# Patient Record
Sex: FEMALE | Race: WHITE | NOT HISPANIC OR LATINO | Employment: OTHER | URBAN - METROPOLITAN AREA
[De-identification: names, ages, dates, MRNs, and addresses within clinical notes are randomized per-mention and may not be internally consistent; named-entity substitution may affect disease eponyms.]

---

## 2017-09-19 PROBLEM — R25.1 TREMORS OF NERVOUS SYSTEM: Status: ACTIVE | Noted: 2017-09-19

## 2017-11-27 PROBLEM — I48.0 PAF (PAROXYSMAL ATRIAL FIBRILLATION): Status: ACTIVE | Noted: 2017-11-27

## 2018-01-18 ENCOUNTER — OFFICE VISIT (OUTPATIENT)
Dept: URGENT CARE | Facility: CLINIC | Age: 78
End: 2018-01-18
Payer: MEDICARE

## 2018-01-18 VITALS
DIASTOLIC BLOOD PRESSURE: 91 MMHG | TEMPERATURE: 98 F | OXYGEN SATURATION: 96 % | WEIGHT: 171 LBS | BODY MASS INDEX: 26.84 KG/M2 | SYSTOLIC BLOOD PRESSURE: 144 MMHG | RESPIRATION RATE: 16 BRPM | HEIGHT: 67 IN | HEART RATE: 77 BPM

## 2018-01-18 DIAGNOSIS — R52 PAIN: ICD-10-CM

## 2018-01-18 DIAGNOSIS — S93.401A SPRAIN OF RIGHT ANKLE, UNSPECIFIED LIGAMENT, INITIAL ENCOUNTER: Primary | ICD-10-CM

## 2018-01-18 PROCEDURE — 99203 OFFICE O/P NEW LOW 30 MIN: CPT | Mod: S$GLB,,, | Performed by: PHYSICIAN ASSISTANT

## 2018-01-18 RX ORDER — METHYLPREDNISOLONE 4 MG/1
TABLET ORAL
Qty: 1 PACKAGE | Refills: 0 | Status: SHIPPED | OUTPATIENT
Start: 2018-01-18 | End: 2018-02-22 | Stop reason: ALTCHOICE

## 2018-01-18 NOTE — PATIENT INSTRUCTIONS
- Please return here or go to the Emergency Department for any concerns or worsening of condition.   - Please follow up with your primary care provider (PCP) or discussed specialist(s) as needed.           Understanding Ankle Sprain    The ankle is the joint where the leg and foot meet. Bones are held in place by connective tissue called ligaments. When ankle ligaments are stretched to the point of pain and injury, it is called an ankle sprain. A sprain can tear the ligaments. These tears can be very small but still cause pain. Ankle sprains can be mild or severe.  What causes an ankle sprain?  A sprain may occur when you twist your ankle or bend it too far. This can happen when you stumble or fall. Things that can make an ankle sprain more likely include:  · Having had an ankle sprain before  · Playing sports that involve running and jumping. Or playing contact sports such as football or hockey.  · Wearing shoes that dont support your feet and ankles well  · Having ankles with poor strength and flexibility  Symptoms of an ankle sprain  Symptoms may include:  · Pain or soreness in the ankle  · Swelling  · Redness or bruising  · Not being able to walk or put weight on the affected foot  · Reduced range of motion in the ankle  · A popping or tearing feeling at the time the sprain occurs  · An abnormal or dislocated look to the ankle  · Instability or too much range of motion in the ankle  Treatment for an ankle sprain  Treatment focuses on reducing pain and swelling, and avoiding further injury. Treatments may include:  · Resting the ankle. Avoid putting weight on it. This may mean using crutches until the sprain heals.  · Prescription or over-the-counter pain medicines. These help reduce swelling and pain.  · Cold packs. These help reduce pain and swelling.  · Raising your ankle above your heart. This helps reduce swelling.  · Wrapping the ankle with an elastic bandage or ankle brace. This helps reduce swelling  and gives some support to the ankle. In rare cases, you may need a cast or boot.  · Stretching and other exercises. These improve flexibility and strength.  · Heat packs. These may be recommended before doing ankle exercises.  Possible complications of an ankle sprain  An ankle that has been weakened by a sprain can be more likely to have repeated sprains afterward. Doing exercises to strengthen your ankle and improve balance can reduce your risk for repeated sprains. Other possible complications are long-term (chronic) pain or an ankle that remains unstable.  When to call your healthcare provider  Call your healthcare provider right away if you have any of these:  · Fever of 100.4°F (38°C) or higher, or as directed  · Pain, numbness, discoloration, or coldness in the foot or toes  · Pain that gets worse  · Symptoms that dont get better, or get worse  · New symptoms   Date Last Reviewed: 3/10/2016  © 8829-7094 [employer]. [address] [place] [address]. All rights reserved. This information is not intended as a substitute for professional medical care. Always follow your healthcare professional's instructions.      Treating Ankle Sprains  Treatment will depend on how bad your sprain is. For a severe sprain, healing may take 3 months or more.  Right after your injury: Use R.I.C.E.  · Rest: At first, keep weight off the ankle as much as you can. You may be given crutches to help you walk without putting weight on the ankle.  · Ice: Put an ice pack on the ankle for 15 minutes. Remove the pack and wait at least 30 minutes. Repeat for up to 3 days. This helps reduce swelling.  · Compression: To reduce swelling and keep the joint stable, you may need to wrap the ankle with an elastic bandage. For more severe sprains, you may need an ankle brace or a cast.  · Elevation: To reduce swelling, keep your ankle raised above your heart when you sit or lie down.  Medicine  Your healthcare provider may  suggest oral non-steroidal anti-inflammatory medicine (NSAIDs), such as ibuprofen. This relieves the pain and helps reduce any swelling. Be sure to take your medicine as directed.  Contrast baths  After 3 days, soak your ankle in warm water for 30 seconds, then in cool water for 30 seconds. Go back and forth for 5 minutes. Doing this every 2 hours will help keep the swelling down.  Exercises    After about 2 to 3 weeks, you may be given exercises to strengthen the ligaments and muscles in the ankle. Doing these exercises will help prevent another ankle sprain. Exercises may include standing on your toes and then on your heels and doing ankle curls.  Ankle curls  · Sit on the edge of a sturdy table or lie on your back.  · Pull your toes toward you. Then point them away from you. Repeat for 2 to 3 minutes.   Date Last Reviewed: 9/28/2015  © 1169-4101 [email or web address] [address] [place] [address], [place] [address]. All rights reserved. This information is not intended as a substitute for professional medical care. Always follow your healthcare professional's instructions.

## 2018-01-18 NOTE — PROGRESS NOTES
"Subjective:       Patient ID: Anjelica Woods is a 77 y.o. female.    Vitals:  height is 5' 7" (1.702 m) and weight is 77.6 kg (171 lb). Her temperature is 97.8 °F (36.6 °C). Her blood pressure is 144/91 (abnormal) and her pulse is 77. Her respiration is 16 and oxygen saturation is 96%.     Chief Complaint: Ankle Injury ( Right ankle injury.)    Ankle Injury    The incident occurred 2 days ago. The incident occurred at home. The injury mechanism was a twisting injury. The pain is present in the right ankle. The pain is at a severity of 5/10. The pain is moderate. The pain has been worsening since onset. Associated symptoms include an inability to bear weight and a loss of motion. Pertinent negatives include no loss of sensation, muscle weakness, numbness or tingling. She reports no foreign bodies present. The symptoms are aggravated by weight bearing, movement and palpation. She has tried nothing for the symptoms.     Review of Systems   Constitution: Negative for chills, fever, weakness and malaise/fatigue.   HENT: Negative for nosebleeds and sore throat.    Cardiovascular: Negative for chest pain and syncope.   Respiratory: Negative for shortness of breath.    Skin: Negative for itching and rash.   Musculoskeletal: Positive for joint pain and joint swelling. Negative for back pain and neck pain.   Gastrointestinal: Negative for abdominal pain.   Genitourinary: Negative for hematuria.   Neurological: Negative for dizziness, numbness and tingling.       Objective:      Physical Exam   Constitutional: She is oriented to person, place, and time. She appears well-developed and well-nourished.  Non-toxic appearance. She does not have a sickly appearance. She does not appear ill. She appears distressed.   HENT:   Head: Normocephalic and atraumatic. Head is without abrasion, without contusion and without laceration.   Right Ear: External ear normal.   Left Ear: External ear normal.   Nose: Nose normal.   Mouth/Throat: " Oropharynx is clear and moist.   Eyes: Conjunctivae, EOM and lids are normal. Pupils are equal, round, and reactive to light.   Neck: Trachea normal, full passive range of motion without pain and phonation normal. Neck supple.   Cardiovascular: Normal rate, regular rhythm and normal heart sounds.    Pulmonary/Chest: Effort normal and breath sounds normal. No stridor. No respiratory distress.   Musculoskeletal:        Right knee: Normal.        Right ankle: She exhibits decreased range of motion and swelling. She exhibits no ecchymosis, no deformity, no laceration and normal pulse. Tenderness. Lateral malleolus, AITFL, CF ligament and posterior TFL tenderness found. No medial malleolus, no head of 5th metatarsal and no proximal fibula tenderness found. Achilles tendon normal. Achilles tendon exhibits no pain, no defect and normal Frey's test results.        Right lower leg: Normal. She exhibits no tenderness, no bony tenderness, no swelling, no edema, no deformity and no laceration.        Right foot: There is normal range of motion, no tenderness, no bony tenderness, no swelling, normal capillary refill, no crepitus, no deformity and no laceration.   Neurological: She is alert and oriented to person, place, and time. Gait abnormal. Coordination normal.   Skin: Skin is warm, dry and intact. Capillary refill takes less than 2 seconds. No abrasion, no bruising, no burn, no ecchymosis, no laceration, no lesion and no rash noted. No erythema.   Psychiatric: She has a normal mood and affect. Her speech is normal and behavior is normal. Judgment and thought content normal. Cognition and memory are normal.   Nursing note and vitals reviewed.        Comparison: None    Technique: AP, lateral and oblique views of the right ankle.    Findings: There is soft tissue swelling involving the lateral aspect of the right ankle. No radiographically apparent fracture line is identified. There is an osseous density distal to the  fibular tip which appears well-corticated and is most likely chronic. The ankle mortise is maintained. Calcaneal enthesophyte formation is noted.   Impression     1. Soft tissue swelling involving the lateral right ankle without radiographically apparent acute fracture. Findings are suggestive of an ankle sprain.      Electronically signed by: Stephanie Hoffman MD  Date: 01/18/18  Time: 13:54      Assessment:       1. Sprain of right ankle, unspecified ligament, initial encounter    2. Pain        Plan:         Sprain of right ankle, unspecified ligament, initial encounter  -     methylPREDNISolone (MEDROL DOSEPACK) 4 mg tablet; use as directed  Dispense: 1 Package; Refill: 0  -     ORTHOPEDIC BRACING FOR HOME USE - LOWER EXTREMITY    Pain  -     X-Ray Ankle Complete Right; Future; Expected date: 01/18/2018    - Please return here or go to the Emergency Department for any concerns or worsening of condition.   - Use boot as directed. Patient refuses crutches. States that she has a walker at home that she can use.  - Apply ice packs to the affected area(s) for 20-30 minutes several times daily for swelling and pain in addition to rest, elevation, and compression (ex. Extremities). Allow 1 hour between icing sessions to avoid damage to skin.   * Large, cheap, and reusable ice pack(s) can be easily made as follows. INSTRUCTIONS: Mix 1 cup of rubbing (isopropyl) alcohol to 3 cups water into a 1 gallon zip lock bag. Squeeze remaining air out of the bag and seal. Bag(s) to be kept flat in a freezer until cold and after each use.   - Please follow up with your primary care provider (PCP) or discussed specialist(s) as needed.

## 2018-03-28 PROBLEM — R53.83 FATIGUE: Status: ACTIVE | Noted: 2018-03-28

## 2018-03-28 PROBLEM — R06.02 SOB (SHORTNESS OF BREATH): Status: ACTIVE | Noted: 2018-03-28

## 2018-06-15 PROBLEM — I31.39 PERICARDIAL EFFUSION: Status: ACTIVE | Noted: 2018-06-15

## 2018-06-15 PROBLEM — I48.19 PERSISTENT ATRIAL FIBRILLATION: Status: ACTIVE | Noted: 2018-06-15

## 2018-06-15 PROBLEM — R06.02 SOB (SHORTNESS OF BREATH): Status: RESOLVED | Noted: 2018-03-28 | Resolved: 2018-06-15

## 2018-07-10 PROBLEM — I31.39 PERICARDIAL EFFUSION: Status: RESOLVED | Noted: 2018-06-15 | Resolved: 2018-07-10

## 2018-09-17 PROBLEM — I48.20 CHRONIC ATRIAL FIBRILLATION: Status: ACTIVE | Noted: 2018-09-17

## 2018-10-18 PROBLEM — Z98.890 S/P AV NODAL ABLATION: Status: ACTIVE | Noted: 2018-10-18

## 2018-12-03 ENCOUNTER — TELEPHONE (OUTPATIENT)
Dept: NEUROLOGY | Facility: CLINIC | Age: 78
End: 2018-12-03

## 2018-12-27 PROBLEM — R53.83 FATIGUE: Status: RESOLVED | Noted: 2018-03-28 | Resolved: 2018-12-27

## 2018-12-27 PROBLEM — I48.20 CHRONIC ATRIAL FIBRILLATION: Status: ACTIVE | Noted: 2018-12-27

## 2019-02-05 ENCOUNTER — OFFICE VISIT (OUTPATIENT)
Dept: NEUROLOGY | Facility: CLINIC | Age: 79
End: 2019-02-05
Payer: MEDICARE

## 2019-02-05 VITALS
SYSTOLIC BLOOD PRESSURE: 147 MMHG | RESPIRATION RATE: 18 BRPM | DIASTOLIC BLOOD PRESSURE: 80 MMHG | BODY MASS INDEX: 24.5 KG/M2 | HEART RATE: 74 BPM | WEIGHT: 156.38 LBS

## 2019-02-05 DIAGNOSIS — B02.29 POST HERPETIC NEURALGIA: Primary | ICD-10-CM

## 2019-02-05 PROCEDURE — 99214 OFFICE O/P EST MOD 30 MIN: CPT | Mod: S$PBB,,, | Performed by: PSYCHIATRY & NEUROLOGY

## 2019-02-05 PROCEDURE — 99999 PR PBB SHADOW E&M-EST. PATIENT-LVL III: CPT | Mod: PBBFAC,,, | Performed by: PSYCHIATRY & NEUROLOGY

## 2019-02-05 PROCEDURE — 99213 OFFICE O/P EST LOW 20 MIN: CPT | Mod: PBBFAC,PN | Performed by: PSYCHIATRY & NEUROLOGY

## 2019-02-05 PROCEDURE — 99214 PR OFFICE/OUTPT VISIT, EST, LEVL IV, 30-39 MIN: ICD-10-PCS | Mod: S$PBB,,, | Performed by: PSYCHIATRY & NEUROLOGY

## 2019-02-05 PROCEDURE — 99999 PR PBB SHADOW E&M-EST. PATIENT-LVL III: ICD-10-PCS | Mod: PBBFAC,,, | Performed by: PSYCHIATRY & NEUROLOGY

## 2019-02-05 RX ORDER — AMLODIPINE BESYLATE 5 MG/1
5 TABLET ORAL NIGHTLY
COMMUNITY
End: 2019-12-24

## 2019-02-05 NOTE — LETTER
February 15, 2019      Gagandeep Olivares MD  [address]  [place] [address]           [facility]  [address] [facility] [address] [place] [address]  Phone: [phone]  Fax: [phone]          Patient: Anjelica Woods   MR Number: [id number]   YOB: 1940   Date of Visit: 2/5/2019       Dear Dr. Gagandeep Olivares:    Thank you for referring Anjelica Woods to me for evaluation. Attached you will find relevant portions of my assessment and plan of care.    If you have questions, please do not hesitate to call me. I look forward to following Anjelica Woods along with you.    Sincerely,    Jeannine Santillan  CC:  No Recipients    If you would like to receive this communication electronically, please contact [email or web address] or [phone] to request more information on [employer] access.    For providers and/or their staff who would like to refer a patient to [facility], please contact us through our one-stop-shop provider referral line, [facility] , at [phone].    If you feel you have received this communication in error or would no longer like to receive these types of communications, please e-mail [email or web address]

## 2019-02-05 NOTE — PROGRESS NOTES
NEUROLOGY  Outpatient CONSULT    [facility]  [address], [place] [address]  [phone] (office) / [phone] (fax)    Patient Name:  Anjelica Woods  :  1940  MR #:  [id number]  Acct #:  [id number]    Date of Neurology Consult: 2019  Name of Neurologist: Rosalind Cortes D.O, ABPN, AOBNP, ABEM    Other Physicians:  Gagandeep Olivares MD (Primary Care Physician); Gagandeep Olivares MD (Referring)      Chief Complaint: Consult (postherpetic neuralgia, tremors, jerks in (L) leg)      History of Present Illness (HPI):  Anjelica Woods is a 78 y.o. female referred for consultation regarding post-herpetic neuralgia.    Patient states she had shingles two years ago.  This was along her left back wrapping to her axilla and breast.  The pain started after the lesions resolved and it extends all the way from the left back to her left nipple.  She took antiviral therapy, but it was about 2 weeks into the shingles outbreak.      She has no residual scarring.  She has no numbness.  She describes that pain as something crawling.  She will feel like needle sticks when something touches her skin.  She has a severe sunburn sensation.  She states just resting her arms at her side is painful.  She can barely wear a bra or clothes due to the pain.      She tried Lyrica, but it caused swelling in her feet.  She took Cymbalta 30mg a day made her nauseated and it did not help.  Gabapentin 300mg TID made her feel bad and did not help.  She tried amitriptyline 50mg 1-2 pills as needed and had to stop this due to side effects.    She is off all medications currently because nothing made her pain better and all gave her side effects.    She states she has tremors on occasion. She will also get some jerks of her lower extremity, worse on the left.  She states it can be hard to put her pants on and off due to the jerks.  She was seen by neurology in 2016 for this.  She wondered if any of these  could be from amiodarone.    She states she can also lose her balance easily.  She had a bad fall in the past that resulted in a facial injury.  She states when she falls she can't catch herself.  She will lose her balance when changing directions.  She has had hearing testing and has been told she has significant hearing loss.      Treatment to date:    Lyrica 150mg BID  Gabapentin 300mg TID  Elavil 50-100mg HS  Cymbalta 30mg daily    Review of Systems:   General: Weight gain: No, Weight Loss: Yes, Fatigue: No,   Fever: No, Chills: No, Night Sweats: No, Insomnia: No, Excessive sleeping: No   Respiratory:  Cough: No, Shortness of Breath: No,   Wheezing: No, Excessive Snoring: No, Coughing up blood: No  Endocrine: Heat Intolerance: No, Cold Intolerance: Yes,   Excessive Thirst: No, Excessive Hunger: No,   Eyes:  Blurred Vision: No, Double Vision: No,   Light Sensitivity: No, Eye pain: No  Musculoskeletal: Muscle Aches/Pain: No, Joint Pain/Swelling: No, Muscle Cramps: No, Muscle Weakness: No, Neck Pain: No, Back Pain: No   Neurological: Difficulty Walking/Falls: Yes, Headache Migraine: No, Dizziness/Vertigo: No, Fainting: No, Difficulty with Speech: No, Weakness: Yes, Tingling/Numbness: No, Tremors: Yes, Memory Problems: No, Seizures: No, Difficulty Swallowing: No, Altered Taste: No.  Cardiovascular: Chest Pain: No, Shortness of Breath: No,   Palpitations: Yes,  Gastrointestinal: Nausea/Vomiting: No, Constipation: No, Diarrhea: Yes, Bloody Stools: No   Psych/Cog:  Depression: No, Anxiety: No, Hallucinations: No, Problems Concentrating: No  : Frequent Urination: No, Incontinence: No, Blood of Urine: No, Urinary Infections: No, Changes in Sex Drive: No   ENT:Hearing Loss: No, Earache: No, Ringing in Ears: Yes,   Facial Pain: No, Chronic Congestion: No   Immune: Seasonal Allergies: Yes, Hives and/or Rashes: No  The remainder of the review of twelve body systems was reviewed and normal.    Past Medical, Surgical, Family  & Social History:   Past Medical History:   Diagnosis Date    a Atrial Fibrillation S/P Electrical Cardioversion 02/2013     Dr. Dana Ratliff; Dr. Ward Gaviria    a Noncritical CAD On Medical Management     Dr. Dnaa Ratliff; Dr. Ward Gaviria    a Permanent Pacemaker Placed In 08/2018     Dr. Dana Ratliff; Dr. Ward atkinson Hypertension     5/11/16 RXd Low Salt Diet    b Stage 3 CKD     c Hypercholesterolemia     9/12/16 Increased 20 Mg Pravastatin To 40 Mg qHS     d Family H/O DM     e Hypothyroidism     i H/O Tobacco Use     j Chronic Constipation     On Daily MiraLax PRN    j Diverticulosis     j GERD     j H/O Colon Polyps     k Recurrent Gross Hematuria     l Left Knee Pain And Swelling 6/10/16     6/10/16 Referred To Dr. Ayush anderson Right Patellar Tendon Ganglion Cyst     6/10/16 Referred To Dr. Ayush morgan Left Thorax Postherpetic Neuralgia 10/15/2018    11/8/18 RXd Elavil  Mg qHS And Referred To Neurology; Cymbalta 30 Mg Was Not Effective; 8/18/17 RXd Lyrica 75 Mg Bid Caused Edema; Neurontin Also Caused S/Es    m Low-Normal Vitamin B12     9/12/16 RXd OTC B12 2K Mcg PO Daily    m Unsteadiness 10/15/2018    10/23/18 CBC, CMP, TFTs (On Levothyroxine), And Vitamin B12 = Normal    o Allergic Rhinosinusitis     o Bilateral Tinnitus     10/15/18 Referred To Dr. Ayush mitchell Left Facial Hematoma 5/11/16     Occurred After A Fall On 4/27/16    q Vitamin D Deficiency     9/12/16 RXd OTC D3 10K Daily    Wellness Visit 8/18/2017      Past Surgical History:   Procedure Laterality Date    ABLATION Left 2/26/2018    Per[facility] CATH    ABLATION, RADIOFREQUENCY N/A 6/7/2018    Performed by [facility] III, [facility] at [facility] CATH    AV node Ablation N/A 9/17/2018    Performed by [facility] III, [facility] at [facility] CATH    CARDIAC CATHETERIZATION      CARDIAC ELECTROPHYSIOLOGY MAPPING AND ABLATION  02/2018    CARDIOVERSION      Cardioversion   6/7/2018    Per[facility] at [facility] CATH    CARDIOVERSION N/A 4/12/2018    [facility]    Cardioversion N/A 3/12/2018    Per[facility] at [facility]    CARDIOVERSION N/A 10/26/2017    Per[facility] at [facility]    CARDIOVERSION/TEJAS N/A 11/27/2017    Per[facility] at [facility]    COLONOSCOPY      INSERTION, CARDIAC PACEMAKER, DUAL CHAMBER Left 8/20/2018    [facility]    loop recorder      loop recorder removal      PLACEMENT-LOOP RECORDER N/A 2/26/2018    Performed by [facility] III, [facility] at [facility] CATH    REMOVAL-LOOP RECORDER N/A 2/26/2018    Per[facility]    TEJAS During Cath Case N/A 10/26/2017    Per[facility] at [facility]    Transesophageal echo (TEJAS) intra-procedure log documentation  6/7/2018    Per[facility] by [facility] at [facility]    TRANSESOPHAGEAL ECHOCARDIOGRAM (TEJAS) N/A 4/12/2018    Performed by [facility] at [facility]    TRANSESOPHAGEAL ECHOCARDIOGRAM (TEJAS) N/A 3/12/2018    Performed by [facility] III[facility] at [facility]    TRANSESOPHAGEAL ECHOCARDIOGRAM (TEJAS) N/A 11/27/2017    Performed by Ward [facility] III, MD at [facility]     Family History   Problem Relation Age of Onset    Heart disease Mother     Diabetes Mother     Early death Father 42        mva     Alcohol use:  reports that she does not drink alcohol.   (Of note, 0.6 oz = 1 beer or 6 oz = 10 beers).  Tobacco use:  reports that she quit smoking about 38 years ago. she has never used smokeless tobacco.  Street drug use:  reports that she does not use drugs.  Allergies: Lyrica [pregabalin] and Codeine.    Home Medications:     Current Outpatient Medications:     amLODIPine (NORVASC) 5 MG tablet, Take 5 mg by mouth once daily., Disp: , Rfl:     cholecalciferol, vitamin D3, (VITAMIN D3)  2,000 unit Tab, Take 1 tablet (2,000 Units total) by mouth once daily., Disp: , Rfl:     cyanocobalamin (VITAMIN B-12) 2000 MCG tablet, Take 1 tablet (2,000 mcg total) by mouth once daily., Disp: , Rfl:     ipratropium (ATROVENT) 0.03 % nasal spray, 2 sprays by Nasal route 2 (two) times daily as needed for Rhinitis., Disp: 90 mL, Rfl: 3    levothyroxine (SYNTHROID) 75 MCG tablet, TAKE 1 TABLET(75 MCG) BY MOUTH EVERY DAY, Disp: 90 tablet, Rfl: 3    losartan (COZAAR) 100 MG tablet, Take 1 tablet (100 mg total) by mouth once daily., Disp: 30 tablet, Rfl: 5    magnesium oxide-Mg AA chelate (MG-PLUS-PROTEIN) 133 mg Tab, Take 250 mg by mouth once daily., Disp: , Rfl:     potassium chloride (MICRO-K) 10 MEQ CpSR, Take 1 capsule (10 mEq total) by mouth daily as needed (with lasix administration)., Disp: 30 capsule, Rfl: 1    pravastatin (PRAVACHOL) 40 MG tablet, Take 40 mg by mouth every evening. , Disp: , Rfl:     pravastatin (PRAVACHOL) 40 MG tablet, TAKE 1 TABLET(40 MG) BY MOUTH EVERY NIGHT, Disp: 90 tablet, Rfl: 3    XARELTO 20 mg Tab, TAKE 1 TABLET(20 MG) BY MOUTH EVERY DAY, Disp: 90 tablet, Rfl: 3    amitriptyline (ELAVIL) 50 MG tablet, Take 1-2 tablets ( mg total) by mouth every evening., Disp: 180 tablet, Rfl: 3    furosemide (LASIX) 40 MG tablet, Take 1 tablet (40 mg total) by mouth daily as needed. Take 1 po QD for 3 days & monitor response, Disp: 30 tablet, Rfl: 1    Physical Examination:  BP (!) 147/80 (BP Location: Left arm, Patient Position: Sitting, BP Method: Medium (Automatic))   Pulse 74   Resp 18   Wt 70.9 kg (156 lb 6.4 oz)   BMI 24.50 kg/m²     GENERAL:  General appearance: Well, non-toxic appearing.  No apparent distress.  Fundi exam: normal.  Neck: supple.  Carotid auscultation: normal.  Heart auscultation: normal.  Peripheral pulses: normal.  Extremities: normal.    MENTAL STATUS:  Alertness, attention span & concentration: normal.  Language: normal.  Orientation to self, place &  time:  normal.  Memory, recent & remote: normal.  Fund of knowledge: normal.    SPEECH:  Clear and fluent.  Follows complex commands.    CRANIAL NERVES:  Cranial Nerves II-XII were examined.  II - Visual fields: normal.  III, IV, VI: PERRL, EOMI, No ptosis, No nystagmus.  V - Facial sensation: normal.  VII - Face symmetry & mobility: normal.  VIII - Hearing: unable to hear the tuning fork on right, can hear on left.  IX, X - Palate: mobile & midline.  XI - Shoulder shrug: normal.  XII - Tongue protrusion: normal.    GROSS MOTOR:  Gait & station: normal, no ataxia, no festination, no foot drop.  Tone: normal, no cogwheeling, no spasticity.  Abnormal movements: no tremor, no spasms.  Finger-nose & Heel-knee-shin: normal.  Rapid alternating movements & drift: normal.  Romberg: absent            MUSCLE STRENGTH:     Fascics Atrophy RIGHT    LEFT Atrophy Fascics     5 Neck Ext. 5       5 Neck Flex 5       5 Deltoids 5       5 Sh.Ext.Rot. 5       5 Sh.Int.Rot. 5       5 Biceps 5       5 Triceps 5       5 Forearm.Pr. 5       5 Wrist Ext. 5       5 Wrist Flex 5       5 Finger Ext. 5       5 Finger Flex 5       5 FPL 5       5 Inteross. 5                         5 Iliopsoas 5       5 Hip Abduct 5       5 Hip Adduct 5       5 Quads 5       5 Hams 5       5 Dorsiflex 5       5 Plantar Flex 5       5 Ankle Fabiano 5       5 Ankle Invert 5       5 Toe Ext. 5       5 Toe Flex 5                         REFLEXES:    RIGHT Reflex   LEFT   2+ Biceps 2+   2+ Brachiorad. 2+   2+ Triceps 2+        2+ Patellar 2+   2+ Ankle 2+        Down PLANTAR Down     SENSORY:  Light touch: Normal throughout.  Sharp touch: Normal throughout.  Vibration: Normal throughout.      Diagnostic Data Reviewed:   Referral records were reviewed - no information was found in regards to why neurology was consulted.    Assessment and Plan:  Anjelica Woods is a 78 y.o., right handed woman with a normal neurological examination who presents with post-herpetic  neuralgia and intolerance to pain medications.  Unfortunately, she has tried the therapies used for this condition, but has not been able to tolerate them due to side effects.  We reviewed each of the medicines and the side effects she experienced.  We reviewed what options she may have in terms of trying these medications a second time.    Since her issues with Gabapentin were related to daytime intolerance, will try putting her on Gralise (extended release Gabapentin) that she can just take at night.  She has been given instructions on the titration pack and to call for a long term prescription depending on how she is doing with pain control at the escalating doses.    If this works well for her, then she can just have the Rx renewed by her PCP.  If not, will re-try amitriptyline or Cymbalta next.    Important to note, also  has a past medical history of a Atrial Fibrillation S/P Electrical Cardioversion 02/2013, a Noncritical CAD On Medical Management, a Permanent Pacemaker Placed In 08/2018, b Hypertension, b Stage 3 CKD, c Hypercholesterolemia, d Family H/O DM, e Hypothyroidism, i H/O Tobacco Use, j Chronic Constipation, j Diverticulosis, j GERD, j H/O Colon Polyps, k Recurrent Gross Hematuria, l Left Knee Pain And Swelling 6/10/16, l Right Patellar Tendon Ganglion Cyst, m Left Thorax Postherpetic Neuralgia (10/15/2018), m Low-Normal Vitamin B12, m Unsteadiness (10/15/2018), o Allergic Rhinosinusitis, o Bilateral Tinnitus, q Left Facial Hematoma 5/11/16, q Vitamin D Deficiency, and Wellness Visit 8/18/2017.          Rosalind Cortes D.O, ABPN, AOBNP, ABEM    This note was created with voice recognition software.  Grammatical, syntax and spelling errors may be inevitable.

## 2019-02-05 NOTE — PATIENT INSTRUCTIONS
Gralise Dosage and Administration  Postherpetic Neuralgia  Do not use Gralise interchangeably with other gabapentin products.    Titrate Gralise to an 1800 mg dose taken orally once daily with the evening meal. Gralise tablets should be swallowed whole. Do not split, crush, or chew the tablets.    If Gralise dose is reduced, discontinued, or substituted with an alternative medication, this should be done gradually over a minimum of one week or longer (at the discretion of the prescriber).    In adults with postherpetic neuralgia, Gralise therapy should be initiated and titrated as follows:    Table 1: Gralise Recommended Titration Schedule   Day 1  Day 2  Days 3-6   Dose  300 mg 600 mg 900 mg       Days 7-10 Days 11-14 Day 15  Dose 1200 mg 1500 mg 1800 mg      (No interaction noted with Xarelto okay to take together.)    Call Dr. Cortes's office for your long term prescription before your starter pack runs out.  If at any point along the way you want to stop increasing, call for that dose to be called in.  You can also call for side effects to modify your dosing interval.

## 2019-02-06 DIAGNOSIS — B02.29 POST HERPETIC NEURALGIA: ICD-10-CM

## 2019-02-06 NOTE — TELEPHONE ENCOUNTER
----- Message from Vera Yao sent at 2/6/2019  3:15 PM CST -----  Contact: Lay/[phone]  Prior Authorization Needed    Medication: Gralise star 300/600 mg tablets    Pharmacy Info:     Plan does not cover this medication. Please call plan at  [phone]     to initiate prior authorization or call/fax pharmacy to change medication. Patient ID# [id number]    Note chart when prior authorization has been submitted.    Please notify pharmacy when prior authorization has been approved.    Thank You

## 2019-02-07 ENCOUNTER — TELEPHONE (OUTPATIENT)
Dept: NEUROLOGY | Facility: CLINIC | Age: 79
End: 2019-02-07

## 2019-02-07 NOTE — TELEPHONE ENCOUNTER
Spoke with the pt, informed her we were having difficulty reaching [employer] to initiate a PA for Grakyarase. Offered to have rx sent to [facility] to have filled and mailed to the pt as they have a PA dept that is very efficient. Pt declined and would rather wait for PA for local drug store to fill. I initiated the PA through [employer] and will notify the pt as soon as the result comes in.

## 2019-02-21 ENCOUNTER — TELEPHONE (OUTPATIENT)
Dept: NEUROLOGY | Facility: CLINIC | Age: 79
End: 2019-02-21

## 2019-02-27 ENCOUNTER — TELEPHONE (OUTPATIENT)
Dept: NEUROLOGY | Facility: CLINIC | Age: 79
End: 2019-02-27

## 2019-02-27 NOTE — TELEPHONE ENCOUNTER
The patient has been on gabapentin 1800 mg for one week.  She has titrated up since 2/8/2019. C/o swelling and redness to both feet x 1 week. She is having trouble controlling her body movements.  She is also feeling a lot of shakiness and confusion. It has helped some with the shingles pain, but the side effects are not worth it.  Can she stop the medication?  Does she need to decrease it slowly.  Please advise.

## 2019-02-27 NOTE — TELEPHONE ENCOUNTER
----- Message from Roslyn Lamas sent at 2/27/2019  2:28 PM CST -----  Contact: Patient  Type: Needs Medical Advice    Who Called:  Patient  Symptoms (please be specific):  Feet swelling, twitching, confusion  How long has patient had these symptoms:  na  Pharmacy name and phone #:    [facility] [address] [address] & [address] [address]  Phone: [phone] Fax: [phone]     Best Call Back Number: [phone] (home)    Additional Information: Patient states that she was told to call and follow up after takinggabapentin 300 mg (9)- 600 mg (69) Tb24. Patient states that she is now experiencing side effects. Please call to advise. Thank you!

## 2019-02-28 NOTE — TELEPHONE ENCOUNTER
----- Message from Sarah Irizarry sent at 2/28/2019  2:26 PM CST -----  Type: Needs Medical Advice    Who Called:  Self Symptoms (please be specific):  NA  How long has patient had these symptoms: GRACE   Pharmacy name and phone #:  [facility]   Best Call Back Number: [phone]  Additional Information: Patient left a message on yesterday asking if she can stop taking rx gabapentin or should the patient wean herself off the medication. Due to side effects-confusion, swelling of the feet, shakiness.

## 2019-03-01 ENCOUNTER — NURSE TRIAGE (OUTPATIENT)
Dept: ADMINISTRATIVE | Facility: CLINIC | Age: 79
End: 2019-03-01

## 2019-03-01 ENCOUNTER — TELEPHONE (OUTPATIENT)
Dept: NEUROLOGY | Facility: CLINIC | Age: 79
End: 2019-03-01

## 2019-03-01 NOTE — TELEPHONE ENCOUNTER
Pt c/o swelling an redness to feet, confusion and twitching x 1-2 weeks r/t Gralise 02-08-19. Up to 1800mg daily. Pt would like to stop this medication, how can we titrate down. Please advise.

## 2019-03-01 NOTE — TELEPHONE ENCOUNTER
See other phone note regarding weaning medication to try to eliminate side effects.    I am hoping she will do better at a lower dose, ie 600mg would eliminate side effects but still give some pain control.    If she has to come off the medicine entirely, ie can't get pain relief and relief of side effects at a lower dose, then we can try an alternative medication for her shingles pain.

## 2019-03-01 NOTE — TELEPHONE ENCOUNTER
Reason for Disposition   Caller has URGENT medication question about med that PCP prescribed and triager unable to answer question    Protocols used: ST MEDICATION QUESTION CALL-A-  Concerns regarding new meds. Pt having reaction for the past couple weeks. feet swelling red, hands red. On starter pack for gabapentin. Having nerve pain after shingles. Confused, shaky, no trouble breathing or swallowing. Pt transferred to speak with Delfino at [facility].

## 2019-03-01 NOTE — TELEPHONE ENCOUNTER
----- Message from Delfino Wilcox sent at 3/1/2019  2:18 PM CST -----  Contact: Patient  Advised needs to speak with the nurse regarding weaning off  this medication safely due to side effects.  The medication:gabapentin 300 mg (9)- 600 mg (69) Tb24    This is her third time calling on this request.  Please call [phone]

## 2019-03-01 NOTE — TELEPHONE ENCOUNTER
Decrease back to just the 600mg tablet daily.  See if the symptoms improve after 3 days.  If no better, then decreased to the 300mg tablet.  If she is still having side effects after a few days, or if she is having pain at this level, then she can discontinue the medication.

## 2019-03-01 NOTE — TELEPHONE ENCOUNTER
Spoke with pt and relayed Dr. Cortes's message; stressed the importance of the down titration process to which pt verbalized understanding; states that she will let us know how she is doing.

## 2019-03-13 ENCOUNTER — TELEPHONE (OUTPATIENT)
Dept: NEUROLOGY | Facility: CLINIC | Age: 79
End: 2019-03-13

## 2019-03-13 DIAGNOSIS — B02.29 POST HERPETIC NEURALGIA: Primary | ICD-10-CM

## 2019-03-13 NOTE — TELEPHONE ENCOUNTER
----- Message from Heri Ridley sent at 3/13/2019  1:42 PM CDT -----  Contact: Patient  Type: Needs Medical Advice    Who Called:  Patient  Best Call Back Number: [phone]  Additional Information: Patient has questions regarding medication due to side-effects. Please call to advise. Thanks!

## 2019-03-13 NOTE — TELEPHONE ENCOUNTER
Spoke with patient; she would like to start back Gabapentin due to pain. Please advise. She currently has a 300mg pill bottle of the Gabapentin and wants to start it with instructions.

## 2019-03-20 PROBLEM — I25.10 CAD (CORONARY ARTERY DISEASE): Status: ACTIVE | Noted: 2019-03-20

## 2019-03-20 PROBLEM — I25.84 CORONARY ARTERY DISEASE DUE TO CALCIFIED CORONARY LESION: Status: ACTIVE | Noted: 2019-03-20

## 2019-03-25 NOTE — TELEPHONE ENCOUNTER
The patient started the medication because her feet were in such pain.  When she got up to 1500 mg her feet were swollen. She has since taken herself off of the medication. Please advise.

## 2019-03-25 NOTE — TELEPHONE ENCOUNTER
Gabapentin 300mg    Week AM Midday PM   1 - - 1   2 1 - 1   3 1 1 1   4 1 1 2   5 2 1 2   6 2 2 2       If the medication works at a lower dose, she does not need to keep advancing.    Common side effects are fatigue and lightheadedness.  These should pass after a few days.    Please contact the office with any questions.

## 2019-03-29 RX ORDER — OXCARBAZEPINE 150 MG/1
TABLET, FILM COATED ORAL
Qty: 120 TABLET | Refills: 5 | Status: ON HOLD | OUTPATIENT
Start: 2019-03-29 | End: 2019-06-11

## 2019-03-29 NOTE — TELEPHONE ENCOUNTER
Spoke with pt and discussed new medication with her that head been sent electronically to pt pharmacy of choice; pt instructed to to contact us back with questions/converns; verbalized understanding.

## 2019-04-02 ENCOUNTER — TELEPHONE (OUTPATIENT)
Dept: NEUROLOGY | Facility: CLINIC | Age: 79
End: 2019-04-02

## 2019-04-02 NOTE — TELEPHONE ENCOUNTER
Spoke with pt and discussed appt/scheduling; pt canceled appt at this time and was instructed to call us when ready to reschedule; verbalized understanding.

## 2019-04-02 NOTE — TELEPHONE ENCOUNTER
----- Message from Annie Peña sent at 4/2/2019 12:39 PM CDT -----  Contact: patient  Type:  Patient Returning Call    Who Called:  patient  Who Left Message for Patient:    Does the patient know what this is regarding?:  scheduling  Best Call Back Number:  [phone]  Additional Information:

## 2019-05-09 ENCOUNTER — TELEPHONE (OUTPATIENT)
Dept: NEUROLOGY | Facility: CLINIC | Age: 79
End: 2019-05-09

## 2019-05-09 NOTE — TELEPHONE ENCOUNTER
Spoke with pt and rescheduled her follow up appt with Dr. Cortes for neuralgia; date/time/location confirmed; verbalized understanding.

## 2019-05-09 NOTE — TELEPHONE ENCOUNTER
----- Message from Nicolasa Max RT sent at 5/9/2019  2:42 PM CDT -----  Contact: pt    pt , requesting a call back soon to reschedule her Cx appt of 05/21/2019 and seeking medical advise about the medication she needs to take prior, thanks.

## 2019-05-23 ENCOUNTER — OFFICE VISIT (OUTPATIENT)
Dept: NEUROLOGY | Facility: CLINIC | Age: 79
End: 2019-05-23
Payer: MEDICARE

## 2019-05-23 VITALS
DIASTOLIC BLOOD PRESSURE: 75 MMHG | SYSTOLIC BLOOD PRESSURE: 134 MMHG | WEIGHT: 160.31 LBS | HEART RATE: 74 BPM | BODY MASS INDEX: 25.11 KG/M2 | RESPIRATION RATE: 18 BRPM

## 2019-05-23 DIAGNOSIS — B02.29 POST HERPETIC NEURALGIA: Primary | ICD-10-CM

## 2019-05-23 DIAGNOSIS — T88.7XXA MEDICATION SIDE EFFECTS: ICD-10-CM

## 2019-05-23 DIAGNOSIS — R27.8 ASTERIXIS: ICD-10-CM

## 2019-05-23 PROCEDURE — 99999 PR PBB SHADOW E&M-EST. PATIENT-LVL IV: CPT | Mod: PBBFAC,,, | Performed by: PSYCHIATRY & NEUROLOGY

## 2019-05-23 PROCEDURE — 99999 PR PBB SHADOW E&M-EST. PATIENT-LVL IV: ICD-10-PCS | Mod: PBBFAC,,, | Performed by: PSYCHIATRY & NEUROLOGY

## 2019-05-23 PROCEDURE — 99215 PR OFFICE/OUTPT VISIT, EST, LEVL V, 40-54 MIN: ICD-10-PCS | Mod: S$PBB,,, | Performed by: PSYCHIATRY & NEUROLOGY

## 2019-05-23 PROCEDURE — 99214 OFFICE O/P EST MOD 30 MIN: CPT | Mod: PBBFAC,PN | Performed by: PSYCHIATRY & NEUROLOGY

## 2019-05-23 PROCEDURE — 99215 OFFICE O/P EST HI 40 MIN: CPT | Mod: S$PBB,,, | Performed by: PSYCHIATRY & NEUROLOGY

## 2019-05-23 NOTE — PATIENT INSTRUCTIONS
You can decrease to one pill of Trileptal twice a day.  See if your side effects improve at a lower dose.  See if the lower dose helps your symptoms.  If you still have side effects and there is not benefit for pain control, then you can just stop the Trileptal.  Try to give it a few weeks if you can, but if it is intolerable, that is okay.    You have been referred to a pain management physician to see if they have any other recommendations for ways to manage this symptom for you.  You have been referred to Dr. Nicholson at [address] [facility] [address] in [place].

## 2019-05-23 NOTE — PROGRESS NOTES
NEUROLOGY  Outpatient Follow Up    [facility]  [address] [facility], [place] [address]  [phone] (office) / [phone] (fax)    Patient Name:  Anjelica Woods  :  1940  MR #:  [id number]  Acct #:  [id number]    Date of Neurology Visit: 2019  Name of Neurologist: Rosalind Cortes D.O, ABPN, AOBNP, ABEM    Other Physicians:  Gagandeep Olivares MD (Primary Care Physician); No ref. provider found (Referring)      Chief Complaint: Follow-up (Post herpectic neuralgia)      History of Present Illness (HPI):  Anjelica Woods is a 78 y.o. female here for follow up of pain management for PHN.    She had a lot of similar side effects on Trileptal. She has involuntary jerking.  She has excessive sleepiness.  She feels her legs get weak.   She did not start the medication until May 9.  She is now on Trileptal 300mg twice a day.  She thinks it may have helped some with the grabbing and pinching sensation, but still feels like she is on fire all the time.      She is just not feeling like she is tolerating these medications.    Treatment to date:    Lyrica 150mg BID  Gabapentin 300mg TID  Elavil 50-100mg HS  Cymbalta 30mg daily  Gralise  Trileptal 300mg BID    Review of Systems:    General: Weight gain: Yes, Weight Loss: No, Fatigue: Yes,   Fever: No, Chills: No, Night Sweats: Yes, Insomnia: No, Excessive sleeping: Yes   Respiratory:  Cough: No, Shortness of Breath: Yes,   Wheezing: No, Excessive Snoring: No, Coughing up blood: No  Endocrine: Heat Intolerance: No, Cold Intolerance: Yes,   Excessive Thirst: No, Excessive Hunger: No,   Eyes:  Blurred Vision: No, Double Vision: No,   Light Sensitivity: No, Eye pain: No  Musculoskeletal: Muscle Aches/Pain: No, Joint Pain/Swelling: No, Muscle Cramps: No, Muscle Weakness: No, Neck Pain: Yes, Back Pain: Yes   Neurological: Difficulty Walking/Falls: Yes, Headache Migraine: No, Dizziness/Vertigo: No, Fainting: No, Difficulty with Speech: No,  Weakness: Yes, Tingling/Numbness: Yes, Tremors: Yes, Memory Problems: No, Seizures: No, Difficulty Swallowing: No, Altered Taste: No.  Cardiovascular: Chest Pain: No, Shortness of Breath: Yes,   Palpitations: Yes,  Gastrointestinal: Nausea/Vomiting: No, Constipation: Yes, Diarrhea: No, Bloody Stools: No   Psych/Cog:  Depression: No, Anxiety: No, Hallucinations: No, Problems Concentrating: No  : Frequent Urination: No, Incontinence: No, Blood of Urine: No, Urinary Infections: No, Changes in Sex Drive: No   ENT:Hearing Loss: No, Earache: No, Ringing in Ears: Yes,   Facial Pain: No, Chronic Congestion: No   Immune: Seasonal Allergies: Yes, Hives and/or Rashes: No  The remainder of the review of twelve body systems was reviewed and normal.    Past Medical, Surgical, Family & Social History:   Reviewed and updated.    Home Medications:     Current Outpatient Medications:     amitriptyline (ELAVIL) 50 MG tablet, Take 1-2 tablets ( mg total) by mouth every evening., Disp: 180 tablet, Rfl: 3    amLODIPine (NORVASC) 5 MG tablet, Take 5 mg by mouth once daily., Disp: , Rfl:     cholecalciferol, vitamin D3, (VITAMIN D3) 2,000 unit Tab, Take 1 tablet (2,000 Units total) by mouth once daily., Disp: , Rfl:     cyanocobalamin (VITAMIN B-12) 2000 MCG tablet, Take 1 tablet (2,000 mcg total) by mouth once daily., Disp: , Rfl:     furosemide (LASIX) 40 MG tablet, Take 1 tablet (40 mg total) by mouth daily as needed. Take 1 po QD for 3 days & monitor response, Disp: 30 tablet, Rfl: 1    ipratropium (ATROVENT) 0.03 % nasal spray, 2 sprays by Nasal route 2 (two) times daily as needed for Rhinitis., Disp: 90 mL, Rfl: 3    levothyroxine (SYNTHROID) 75 MCG tablet, TAKE 1 TABLET(75 MCG) BY MOUTH EVERY DAY, Disp: 90 tablet, Rfl: 3    losartan (COZAAR) 100 MG tablet, Take 1 tablet (100 mg total) by mouth once daily., Disp: 90 tablet, Rfl: 1    magnesium oxide-Mg AA chelate (MG-PLUS-PROTEIN) 133 mg Tab, Take 250 mg by mouth  once daily., Disp: , Rfl:     OXcarbazepine (TRILEPTAL) 150 MG Tab, Take 1 tablet (150 mg total) by mouth 2 (two) times daily for 7 days, THEN 2 tablets (300 mg total) 2 (two) times daily., Disp: 120 tablet, Rfl: 5    potassium chloride (MICRO-K) 10 MEQ CpSR, Take 1 capsule (10 mEq total) by mouth daily as needed (with lasix administration)., Disp: 30 capsule, Rfl: 1    pravastatin (PRAVACHOL) 40 MG tablet, TAKE 1 TABLET(40 MG) BY MOUTH EVERY NIGHT, Disp: 90 tablet, Rfl: 3    XARELTO 20 mg Tab, TAKE 1 TABLET(20 MG) BY MOUTH EVERY DAY, Disp: 90 tablet, Rfl: 3    Physical Examination:  /75 (BP Location: Left arm, Patient Position: Sitting, BP Method: Medium (Automatic))   Pulse 74   Resp 18   Wt 72.7 kg (160 lb 4.8 oz)   BMI 25.11 kg/m²     GENERAL:  General appearance: Well, non-toxic appearing.  No apparent distress.  Extremities: normal.    MENTAL STATUS:  Alertness, attention span & concentration: normal.  Language: normal.  Orientation to self, place & time:  normal.  Memory, recent & remote: normal.  Fund of knowledge: normal.     SPEECH:  Clear and fluent.  Follows complex commands.     CRANIAL NERVES:  Cranial Nerves II-XII were examined.  II - Visual fields: normal.  III, IV, VI: PERRL, EOMI, No ptosis, No nystagmus.  V - Facial sensation: normal.  VII - Face symmetry & mobility: normal.  VIII - Hearing: unable to hear the tuning fork on right, can hear on left.  IX, X - Palate: mobile & midline.  XI - Shoulder shrug: normal.  XII - Tongue protrusion: normal.     GROSS MOTOR:  Gait & station: normal, no ataxia, no festination, no foot drop.  Tone: normal, no cogwheeling, no spasticity.  Abnormal movements: some jerking in the legs when trying to coordinate movements, also with some asterixis in the hands.  Finger-nose & Heel-knee-shin: normal.  Rapid alternating movements & drift: normal       MUSCLE STRENGTH:     Fascics Atrophy RIGHT    LEFT Atrophy Fascics     5 Deltoids 5       5 Biceps 5        5 Triceps 5       5 Forearm.Pr. 5       5 Inteross. 5                         5 Iliopsoas 5       5 Quads 5       5 Hams 5       5 Dorsiflex 5       5 Plantar Flex 5          REFLEXES:     RIGHT Reflex    LEFT   2+ Biceps 2+   2+ Brachiorad. 2+   2+ Triceps 2+           2+ Patellar 2+   2+ Ankle 2+           Down PLANTAR Down      SENSORY:  Light touch: Normal throughout.  Sharp touch: Normal throughout.  Vibration: Normal throughout.        Diagnostic Data Reviewed:   Referral records were reviewed - no information was found in regards to why neurology was consulted.     Assessment and Plan:  Anjelica Woods is a 78 y.o., right handed woman with a normal neurological examination who presents with post-herpetic neuralgia and intolerance to pain medications.  Unfortunately, she has tried the therapies used for this condition, but has not been able to tolerate them due to side effects.  We reviewed each of the medicines and the side effects she experienced.  We reviewed what options she may have in terms of trying these medications a second time.     Since her issues with Gabapentin were related to daytime intolerance, will tried putting her on Gralise (extended release Gabapentin) that she can just take at night.  She remained intolerant.  She tried Trileptal and was intolerant.       On exam today she has asterixis (metabolic tremor). Will get labs today to check liver and kidney function.    She is going to wean trileptal and will be referred to pain management for opinions on alternative options.      Important to note, also  has a past medical history of a Chronic Anticoagulation Witth Xarelto, b Stage 3 CKD, c Hypercholesterolemia, d Family H/O DM, i H/O Tobacco Use, j Chronic Constipation, j Diverticulosis, j GERD, j H/O Colon Polyps, k Recurrent Gross Hematuria, l Left Knee Pain And Swelling 6/10/16, l Right Patellar Tendon Ganglion Cyst, m Left Thorax Postherpetic Neuralgia (###), m Low-Normal Vitamin B12,  m Unsteadiness (###), o Allergic Rhinosinusitis, o Bilateral Tinnitus, q Left Facial Hematoma 5/11/16, q Vitamin D Deficiency, and Wellness Visit 8/18/2017.        Rosalind Cortes D.O, ABPN, AOBNP, ABEM

## 2019-06-07 ENCOUNTER — OFFICE VISIT (OUTPATIENT)
Dept: PAIN MEDICINE | Facility: CLINIC | Age: 79
End: 2019-06-07
Payer: MEDICARE

## 2019-06-07 VITALS
BODY MASS INDEX: 24.72 KG/M2 | WEIGHT: 157.5 LBS | DIASTOLIC BLOOD PRESSURE: 80 MMHG | SYSTOLIC BLOOD PRESSURE: 181 MMHG | HEIGHT: 67 IN | HEART RATE: 90 BPM

## 2019-06-07 DIAGNOSIS — B02.29 POSTHERPETIC NEURALGIA: Primary | ICD-10-CM

## 2019-06-07 PROCEDURE — 99204 PR OFFICE/OUTPT VISIT, NEW, LEVL IV, 45-59 MIN: ICD-10-PCS | Mod: S$PBB,,, | Performed by: ANESTHESIOLOGY

## 2019-06-07 PROCEDURE — 99999 PR PBB SHADOW E&M-EST. PATIENT-LVL III: CPT | Mod: PBBFAC,,, | Performed by: ANESTHESIOLOGY

## 2019-06-07 PROCEDURE — 99213 OFFICE O/P EST LOW 20 MIN: CPT | Mod: PBBFAC,PN | Performed by: ANESTHESIOLOGY

## 2019-06-07 PROCEDURE — 99204 OFFICE O/P NEW MOD 45 MIN: CPT | Mod: S$PBB,,, | Performed by: ANESTHESIOLOGY

## 2019-06-07 PROCEDURE — 99999 PR PBB SHADOW E&M-EST. PATIENT-LVL III: ICD-10-PCS | Mod: PBBFAC,,, | Performed by: ANESTHESIOLOGY

## 2019-06-07 RX ORDER — LIDOCAINE 50 MG/G
1 PATCH TOPICAL DAILY
Qty: 30 PATCH | Refills: 0 | Status: SHIPPED | OUTPATIENT
Start: 2019-06-07 | End: 2019-06-10 | Stop reason: CLARIF

## 2019-06-07 RX ORDER — TRAMADOL HYDROCHLORIDE 50 MG/1
50 TABLET ORAL EVERY 12 HOURS PRN
Qty: 28 TABLET | Refills: 0 | Status: SHIPPED | OUTPATIENT
Start: 2019-06-07 | End: 2019-06-10 | Stop reason: CLARIF

## 2019-06-07 NOTE — PROGRESS NOTES
[facility] New Patient Evaluation    Referred by: Dr. Cortes  Reason for referral: PHN    CC:   Chief Complaint   Patient presents with    Flank Pain     left side post shingles      Last 3 PDI Scores 6/7/2019   Pain Disability Index (PDI) 34       HPI:   Anjelica Woods is a 78 y.o. female who complains of PHN    Onset: 2 years  Progression: since onset, pain is gradually worsening  Current Pain Score: 6/10  Timing: constant  Quality: burning  Radiation: yes, from back around left thoracic region to left nipple  Associated numbness or weakness: no numbness, no weakness  Exacerbated by: nothing  Allievated by: nothing  Is Pain Level Acceptable?: No    Previous Therapies:  PT/OT:   HEP:   Interventions:   Surgery:  Medications:   - NSAIDS:   - MSK Relaxants:   - TCAs: amitriptyline  - SNRIs: cymbalta  - Topicals: OTC 4% lidocaine cream  - Anticonvulsants: gabapentin, gabapentin ER, lyrica, trileptal   - Opioids:     History:    Current Outpatient Medications:     amitriptyline (ELAVIL) 50 MG tablet, Take 1-2 tablets ( mg total) by mouth every evening., Disp: 180 tablet, Rfl: 3    amLODIPine (NORVASC) 5 MG tablet, Take 5 mg by mouth once daily., Disp: , Rfl:     cholecalciferol, vitamin D3, (VITAMIN D3) 2,000 unit Tab, Take 1 tablet (2,000 Units total) by mouth once daily., Disp: , Rfl:     cyanocobalamin (VITAMIN B-12) 2000 MCG tablet, Take 1 tablet (2,000 mcg total) by mouth once daily., Disp: , Rfl:     furosemide (LASIX) 40 MG tablet, Take 1 tablet (40 mg total) by mouth daily as needed. Take 1 po QD for 3 days & monitor response, Disp: 30 tablet, Rfl: 1    ipratropium (ATROVENT) 0.03 % nasal spray, 2 sprays by Nasal route 2 (two) times daily as needed for Rhinitis., Disp: 90 mL, Rfl: 3    levothyroxine (SYNTHROID) 75 MCG tablet, TAKE 1 TABLET(75 MCG) BY MOUTH EVERY DAY, Disp: 90 tablet, Rfl: 3    losartan (COZAAR) 100 MG tablet, Take 1 tablet (100 mg total) by mouth once daily., Disp: 90  tablet, Rfl: 1    magnesium oxide-Mg AA chelate (MG-PLUS-PROTEIN) 133 mg Tab, Take 250 mg by mouth once daily., Disp: , Rfl:     OXcarbazepine (TRILEPTAL) 150 MG Tab, Take 1 tablet (150 mg total) by mouth 2 (two) times daily for 7 days, THEN 2 tablets (300 mg total) 2 (two) times daily., Disp: 120 tablet, Rfl: 5    potassium chloride (MICRO-K) 10 MEQ CpSR, Take 1 capsule (10 mEq total) by mouth daily as needed (with lasix administration)., Disp: 30 capsule, Rfl: 1    pravastatin (PRAVACHOL) 40 MG tablet, TAKE 1 TABLET(40 MG) BY MOUTH EVERY NIGHT, Disp: 90 tablet, Rfl: 3    XARELTO 20 mg Tab, TAKE 1 TABLET(20 MG) BY MOUTH EVERY DAY, Disp: 90 tablet, Rfl: 3    lidocaine (LIDODERM) 5 %, Place 1 patch onto the skin once daily. Remove & Discard patch within 12 hours or as directed by MD, Disp: 30 patch, Rfl: 0    traMADol (ULTRAM) 50 mg tablet, Take 1 tablet (50 mg total) by mouth every 12 (twelve) hours as needed for Pain., Disp: 28 tablet, Rfl: 0    Past Medical History:   Diagnosis Date    a Chronic Anticoagulation Witth Xarelto     Anticoagulant long-term use     b Stage 3 CKD     c Hypercholesterolemia     9/12/16 Increased 20 Mg Pravastatin To 40 Mg qHS     Cardiac pacemaker in situ     Chronic atrial fibrillation     d Family H/O DM     i H/O Tobacco Use     j Chronic Constipation     On Daily MiraLax PRN    j Diverticulosis     j GERD     j H/O Colon Polyps     k Recurrent Gross Hematuria     l Left Knee Pain And Swelling 6/10/16     6/10/16 Referred To Dr. Ayush anderson Right Patellar Tendon Ganglion Cyst     6/10/16 Referred To Dr. Ayush morgan Left Thorax Postherpetic Neuralgia ###    Dr. Rosalind Cortes ([facility]) RXd Trileptal After RXd Gabapentin Caused S/Es; 11/8/18 RXd Elavil  Mg qHS And Referred To Neurology; Cymbalta 30 Mg Was Not Effective; 8/18/17 RXd Lyrica 75 Mg Bid Caused Edema; Neurontin Also Caused S/Es    m Low-Normal Vitamin B12     9/12/16 RXd OTC  B12 2K Mcg PO Daily    m Unsteadiness ###    10/23/18 CBC, CMP, TFTs (On Levothyroxine), And Vitamin B12 = Normal    o Allergic Rhinosinusitis     o Bilateral Tinnitus     10/15/18 Referred To Dr. Ayush Gross    q Left Facial Hematoma 5/11/16     Occurred After A Fall On 4/27/16    q Vitamin D Deficiency     9/12/16 RXd OTC D3 10K Daily    Wellness Visit 8/18/2017        Past Surgical History:   Procedure Laterality Date    ABLATION Left 2/26/2018    [facility]    ABLATION, RADIOFREQUENCY N/A 6/7/2018    Per[facility] at [facility]    AV node Ablation N/A 9/17/2018    Per[facility] at [facility]    CARDIAC CATHETERIZATION      CARDIAC ELECTROPHYSIOLOGY MAPPING AND ABLATION  02/2018    CARDIOVERSION      Cardioversion  6/7/2018    [facility]    CARDIOVERSION N/A 4/12/2018    [facility]    Cardioversion N/A 3/12/2018    [facility] at [facility]    CARDIOVERSION N/A 10/26/2017    [facility]    CARDIOVERSION/TEJAS N/A 11/27/2017    [facility] at [facility]    COLONOSCOPY      INSERTION, CARDIAC PACEMAKER, DUAL CHAMBER Left 8/20/2018    [facility] at [facility] CATH    loop recorder      loop recorder removal      PLACEMENT-LOOP RECORDER N/A 2/26/2018    Performed by [facility], [facility]    REMOVAL-LOOP RECORDER N/A 2/26/2018    Per[facility] at [facility]    TEJAS During Cath Case N/A 10/26/2017    Per[facility]    Transesophageal echo (TEJSA) intra-procedure log documentation  6/7/2018    Per[facility] at [facility]    TRANSESOPHAGEAL ECHOCARDIOGRAM (TEJAS) N/A 4/12/2018    Per[facility]    TRANSESOPHAGEAL  ECHOCARDIOGRAM (TEJAS) N/A 3/12/2018    Per[facility] at [facility]    TRANSESOPHAGEAL ECHOCARDIOGRAM (TEJAS) N/A 2017    Performed by [facility] at [facility]       Family History   Problem Relation Age of Onset    Heart disease Mother     Diabetes Mother     Early death Father 42        mva       Social History     Socioeconomic History    Marital status:      Spouse name: Not on file    Number of children: Not on file    Years of education: Not on file    Highest education level: Not on file   Occupational History    Not on file   Social Needs    Financial resource strain: Not on file    Food insecurity:     Worry: Not on file     Inability: Not on file    Transportation needs:     Medical: Not on file     Non-medical: Not on file   Tobacco Use    Smoking status: Former Smoker     Last attempt to quit: 4/10/1980     Years since quittin.1    Smokeless tobacco: Never Used    Tobacco comment: Quit 30 years   Substance and Sexual Activity    Alcohol use: No     Alcohol/week: 0.0 oz    Drug use: No    Sexual activity: Not on file   Lifestyle    Physical activity:     Days per week: Not on file     Minutes per session: Not on file    Stress: Not on file   Relationships    Social connections:     Talks on phone: Not on file     Gets together: Not on file     Attends Sikhism service: Not on file     Active member of club or organization: Not on file     Attends meetings of clubs or organizations: Not on file     Relationship status: Not on file   Other Topics Concern    Not on file   Social History Narrative    Not on file       Review of patient's allergies indicates:   Allergen Reactions    Gabapentin     Lyrica [pregabalin]     Oxcarbazepine      Lose muscle control    Codeine Rash       Review of Systems:  General ROS: negative for - fever  Psychological ROS: negative for - hostility  Hematological and Lymphatic ROS: positive for - easy  "bruising  Endocrine ROS: negative for - unexpected weight changes  Respiratory ROS: positive for - shortness of breath  Cardiovascular ROS: no chest pain or dyspnea on exertion  Gastrointestinal ROS: positive for - constipation  Musculoskeletal ROS: negative for - muscular weakness  Neurological ROS: negative for - bowel and bladder control changes or impaired coordination/balance  Dermatological ROS: positive for - rash    Physical Exam:  Vitals:    06/07/19 1505   BP: (!) 181/80   Pulse: 90   Weight: 71.5 kg (157 lb 8.3 oz)   Height: 5' 7" (1.702 m)   PainSc:   6     Body mass index is 24.67 kg/m².     Gen: NAD  Psych: mood appropriate for given condition  CV:RRR  HEENT: anicteric   Respiratory: non labored  Abd: soft nt, nd  Skin: intact, 2 lesions under left mid-axillary.  + allodynia in left T4-T6 distribution  Sensation: + allodynia in left T4-T6 distribution  ROM: Cervical ROM full, shoulder, elbow and wrist ROM full, no scapular dysmotility   Tone:  Normal at elbow, wrist and shoulder   Inspection: no atrophy of bicep, FDI or APB noted, no scapular winging    Motor:    Right Left   C4 Shoulder Abduction  5  5   C5 Elbow Flexion    5  5   C6 Wrist Extension  5  5   C7 Elbow Extension   5  5   C8/T1 Hand Intrinsics   5  5   C8 First Dorsal Interosseus  5  5   C8 Abductor Pollicus Brevis  5  5     Imaging:  none    Labs:  BMP  Lab Results   Component Value Date     05/24/2019    K 3.6 05/24/2019     05/24/2019    CO2 26 05/24/2019    BUN 21 (H) 05/24/2019    CREATININE 0.97 05/24/2019    CALCIUM 8.8 05/24/2019    ANIONGAP 9 05/24/2019    ESTGFRAFRICA >60 05/24/2019    EGFRNONAA 55 (A) 05/24/2019     Lab Results   Component Value Date    ALT 31 05/24/2019    AST 27 05/24/2019    ALKPHOS 71 05/24/2019    BILITOT 0.5 05/24/2019       Assessment:  Problem List Items Addressed This Visit        Neuro    Left Thorax Postherpetic Neuralgia - Primary    Relevant Medications    lidocaine (LIDODERM) 5 %    "       Treatment Plan:  78 y.o. year old female with PMH HTN, CKD, CAD, a-fib s/p pacemaker placement presents to the office with left thoracic flank pain.  She reports she had herpes zoster outbreak a little over 2 years ago and since that time has had severe burning pain of her left back radiating to her left chest.  She denies any recent outbreaks, however she recently received the first dose of the zoster vaccine which caused 2 lesions to appear over her left mid axillary region.  Her pain 6/10, is constant and burning, radiating from her back to her left flank and left chest in a T4-6 distribution.    On exam she has + allodynia and two small lesions mid axillary on the left which she said was expected following her vaccine so I won't treat with valacyclovir.  She has tried multiple medications in the past including: lyrica 150mg BID, gabapentin 300mg TID, amitriptyline 50-100mg HS, cymbalta 30mg daily, gralise, and trileptal 300mg BID.  She said she only tried the amitriptyline for a few days because it was making her too drowsy.  I discussed that I wouldn't recommend retrying amitriptyline at a lower dose given her history of a-fib, pacemaker, and age.    I discussed multiple treatment options with Mrs. Woods.  For medication management we will start with a trial of tramadol 50mg po bid prn for 2 weeks.  It has SNRI actions that may be particularly useful for her neuropathic pain.  I would also like her to start using lidoderm 5% patches.  I discussed interventional techniques for pain control.  The first would be an intercostal nerve block.  I also briefly mentioned that dorsal column stimulation was indicated in refractory neuropathic pain secondary to PHN.  She reports that she is going to have a cardiac cath on Tuesday and doesn't want to do a procedure prior to that.  I completely understand, but I also explained that interventions moving forward were going to be difficult if she ends up having a stent  placed b/c she will be started on DAPT and I won't be able to do an injection for at least 6 months.  She understands.  She will follow up in 2 weeks to discuss how the tramadol worked and we will further titrate the medication.    Procedures: none at this time.  Discussed intercostal nerve block and SCS trial  Medications: trial tramadol 50mg po bid prn.  lidoderm patch  Labs: Reviewed and medications are appropriately dosed for current hepatorenal function.  Imaging: No additional recommended at this time.    : Reviewed and consistent with medication use as prescribed.    Edmundo Power M.D.  Interventional Pain Medicine / Anesthesiology

## 2019-06-07 NOTE — LETTER
June 7, 2019      Rosalind Cortes, DO  [address]  [facility] [address]           [facility]  [address] [facility] [address]  [facility] [address]  Phone: [phone]  Fax: [phone]          Patient: Anjelica Woods   MR Number: [id number]   YOB: 1940   Date of Visit: 6/7/2019       Dear Dr. Roslaind Cortes:    Thank you for referring Anjelica Woods to me for evaluation. Attached you will find relevant portions of my assessment and plan of care.    If you have questions, please do not hesitate to call me. I look forward to following Anjelica Woods along with you.    Sincerely,    Edmundo Power MD    Enclosure  CC:  No Recipients    If you would like to receive this communication electronically, please contact [email or web address] or [phone] to request more information on [facility] access.    For providers and/or their staff who would like to refer a patient to [facility], please contact us through our one-stop-shop provider referral line, [facility], at [phone].    If you feel you have received this communication in error or would no longer like to receive these types of communications, please e-mail [email or web address]

## 2019-06-11 PROBLEM — R94.39 ABNORMAL STRESS TEST: Status: ACTIVE | Noted: 2019-06-11

## 2020-12-21 PROBLEM — I47.29 NONSUSTAINED VENTRICULAR TACHYCARDIA: Status: ACTIVE | Noted: 2020-12-21

## 2021-01-10 ENCOUNTER — IMMUNIZATION (OUTPATIENT)
Dept: FAMILY MEDICINE | Facility: CLINIC | Age: 81
End: 2021-01-10
Payer: MEDICARE

## 2021-01-10 DIAGNOSIS — Z23 NEED FOR VACCINATION: ICD-10-CM

## 2021-01-10 PROCEDURE — 91300 COVID-19, MRNA, LNP-S, PF, 30 MCG/0.3 ML DOSE VACCINE: CPT | Mod: PBBFAC,PO

## 2021-01-31 ENCOUNTER — IMMUNIZATION (OUTPATIENT)
Dept: FAMILY MEDICINE | Facility: CLINIC | Age: 81
End: 2021-01-31
Payer: MEDICARE

## 2021-01-31 DIAGNOSIS — Z23 NEED FOR VACCINATION: Primary | ICD-10-CM

## 2021-01-31 PROCEDURE — 0002A COVID-19, MRNA, LNP-S, PF, 30 MCG/0.3 ML DOSE VACCINE: CPT | Mod: PBBFAC,PO

## 2021-01-31 PROCEDURE — 91300 COVID-19, MRNA, LNP-S, PF, 30 MCG/0.3 ML DOSE VACCINE: CPT | Mod: PBBFAC,PO

## 2022-05-18 PROBLEM — Z79.01 CHRONIC ANTICOAGULATION: Status: ACTIVE | Noted: 2022-05-18

## 2022-05-18 PROBLEM — R94.39 ABNORMAL STRESS TEST: Status: RESOLVED | Noted: 2019-06-11 | Resolved: 2022-05-18

## 2023-04-12 PROBLEM — R14.0 ABDOMINAL BLOATING: Status: ACTIVE | Noted: 2023-04-12

## 2023-04-12 PROBLEM — K21.9 GERD (GASTROESOPHAGEAL REFLUX DISEASE): Status: ACTIVE | Noted: 2023-04-12

## 2023-04-12 PROBLEM — R13.10 ODYNOPHAGIA: Status: ACTIVE | Noted: 2023-04-12

## 2023-04-12 PROBLEM — Z86.010 PERSONAL HISTORY OF COLONIC POLYPS: Status: ACTIVE | Noted: 2023-04-12

## 2023-04-12 PROBLEM — M19.011 ARTHROPATHY OF RIGHT SHOULDER: Status: ACTIVE | Noted: 2023-04-12

## 2023-04-12 PROBLEM — K63.5 COLON POLYPS: Status: RESOLVED | Noted: 2023-04-12 | Resolved: 2023-04-12

## 2023-04-12 PROBLEM — K63.5 COLON POLYPS: Status: ACTIVE | Noted: 2023-04-12

## 2023-04-12 PROBLEM — K57.90 DIVERTICULOSIS: Status: ACTIVE | Noted: 2023-04-12

## 2023-04-12 PROBLEM — R10.30 LOWER ABDOMINAL PAIN: Status: ACTIVE | Noted: 2023-04-12

## 2023-05-04 PROBLEM — K80.20 CALCULUS OF GALLBLADDER WITHOUT CHOLECYSTITIS WITHOUT OBSTRUCTION: Status: ACTIVE | Noted: 2023-05-04

## 2023-05-19 PROBLEM — R13.10 ODYNOPHAGIA: Status: ACTIVE | Noted: 2023-05-19

## 2023-05-19 PROBLEM — R13.10 ODYNOPHAGIA: Status: RESOLVED | Noted: 2023-04-12 | Resolved: 2023-05-19

## 2023-05-19 PROBLEM — I07.1 TRICUSPID REGURGITATION: Status: ACTIVE | Noted: 2023-05-19

## 2023-05-19 PROBLEM — I34.0 MITRAL REGURGITATION: Status: ACTIVE | Noted: 2023-05-19

## 2023-05-19 PROBLEM — Q21.10 ASD (ATRIAL SEPTAL DEFECT): Status: ACTIVE | Noted: 2023-05-19

## 2023-10-09 PROBLEM — Z86.79 HISTORY OF HYPOTENSION: Status: ACTIVE | Noted: 2023-10-09

## 2023-10-09 PROBLEM — Q21.10 ASD (ATRIAL SEPTAL DEFECT): Status: ACTIVE | Noted: 2023-10-09

## 2023-10-09 PROBLEM — N18.30 STAGE III CHRONIC KIDNEY DISEASE: Status: ACTIVE | Noted: 2023-10-09

## 2023-10-09 PROBLEM — I95.9 HYPOTENSION, UNSPECIFIED: Status: RESOLVED | Noted: 2023-10-09 | Resolved: 2023-10-09

## 2023-10-09 PROBLEM — Z86.79 HISTORY OF ORTHOSTATIC HYPOTENSION: Status: RESOLVED | Noted: 2023-10-09 | Resolved: 2023-10-09

## 2023-10-09 PROBLEM — Q21.10 ASD (ATRIAL SEPTAL DEFECT): Status: RESOLVED | Noted: 2023-05-19 | Resolved: 2023-10-09

## 2023-10-09 PROBLEM — I95.9 HYPOTENSION, UNSPECIFIED: Status: ACTIVE | Noted: 2023-10-09

## 2023-10-09 PROBLEM — Z86.79 HISTORY OF ORTHOSTATIC HYPOTENSION: Status: ACTIVE | Noted: 2023-10-09

## 2024-02-29 PROBLEM — N18.31 CHRONIC KIDNEY DISEASE, STAGE 3A: Status: ACTIVE | Noted: 2023-10-09
